# Patient Record
Sex: FEMALE | Race: WHITE | Employment: FULL TIME | URBAN - NONMETROPOLITAN AREA
[De-identification: names, ages, dates, MRNs, and addresses within clinical notes are randomized per-mention and may not be internally consistent; named-entity substitution may affect disease eponyms.]

---

## 2020-10-20 ENCOUNTER — OFFICE VISIT (OUTPATIENT)
Dept: OBGYN CLINIC | Age: 45
End: 2020-10-20
Payer: COMMERCIAL

## 2020-10-20 VITALS
SYSTOLIC BLOOD PRESSURE: 116 MMHG | DIASTOLIC BLOOD PRESSURE: 74 MMHG | HEIGHT: 65 IN | WEIGHT: 132.4 LBS | BODY MASS INDEX: 22.06 KG/M2

## 2020-10-20 VITALS — HEIGHT: 65 IN

## 2020-10-20 PROCEDURE — 99396 PREV VISIT EST AGE 40-64: CPT | Performed by: ADVANCED PRACTICE MIDWIFE

## 2020-10-20 SDOH — HEALTH STABILITY: MENTAL HEALTH: HOW OFTEN DO YOU HAVE A DRINK CONTAINING ALCOHOL?: NEVER

## 2020-10-20 ASSESSMENT — ENCOUNTER SYMPTOMS
GASTROINTESTINAL NEGATIVE: 1
EYES NEGATIVE: 1
RESPIRATORY NEGATIVE: 1

## 2020-10-20 NOTE — PROGRESS NOTES
YEARLY PHYSICAL    Date of service: 10/20/2020    Linette Valle  Is a 41 y.o.   female    PT's PCP is: No primary care provider on file. : 1975                                             Subjective:       Patient's last menstrual period was 10/06/2020. Are your menses regular: yes    OB History    Para Term  AB Living   3 3 0 0 0 3   SAB TAB Ectopic Molar Multiple Live Births   0 0 0 0 0 3      # Outcome Date GA Lbr Parveen/2nd Weight Sex Delivery Anes PTL Lv   3 Para            2 Para            1 Para                 Social History     Tobacco Use   Smoking Status Never Smoker   Smokeless Tobacco Never Used        Social History     Substance and Sexual Activity   Alcohol Use Never    Frequency: Never       Family History   Problem Relation Age of Onset    Uterine Cancer Paternal Grandmother     Alzheimer's Disease Maternal Grandmother     Colon Cancer Maternal Grandmother     Stomach Cancer Maternal Grandmother     Mental Illness Father     Lung Cancer Mother        Any family history of breast or ovarian cancer: Uterine patient believes, is sure it was female origin    Any family history of blood clots: No      Allergies: Patient has no known allergies.       Current Outpatient Medications:     Multiple Vitamins-Iron (DAILY MULTI-VITAMINS/IRON PO), Take by mouth, Disp: , Rfl:     Social History     Substance and Sexual Activity   Sexual Activity Yes    Partners: Male       Any bleeding or pain with intercourse: No    Last Yearly:  2019    Last pap: 2018 - normal    Last HPV: 2018 - negative    Last Mammogram:  - Maribell Webber requesting records from [facility] n/a    Last colorectal screen- type: n/a    Do you do self breast exams: No    Past Medical History:   Diagnosis Date    Anemia        Past Surgical History:   Procedure Laterality Date    TONSILLECTOMY      WISDOM TOOTH EXTRACTION Family History   Problem Relation Age of Onset    Uterine Cancer Paternal Grandmother     Alzheimer's Disease Maternal Grandmother     Colon Cancer Maternal Grandmother     Stomach Cancer Maternal Grandmother     Mental Illness Father     Lung Cancer Mother        Chief Complaint   Patient presents with    Gynecologic Exam     No concerns voiced,  had a vasectomy        Labs:    No results found for this visit on 10/20/20. HPI: Denies breast/pelvic concerns. Spouse has had a vasectomy. Menses regular. Monogamous relationship. Pap utd. Mammogram current per patient. Does not have PCP that is current with but has wellness labs yearly with spouses employer for insurance    Review of Systems   Constitutional: Negative. HENT: Negative. Eyes: Negative. Respiratory: Negative. Cardiovascular: Negative. Gastrointestinal: Negative. Endocrine: Negative. Genitourinary: Negative. Musculoskeletal: Negative. Neurological: Negative. Psychiatric/Behavioral: Negative. Objective  Blood pressure 116/74, height 5' 5\" (1.651 m), weight 132 lb 6.4 oz (60.1 kg), last menstrual period 10/06/2020. Physical Exam  Constitutional:       Appearance: Normal appearance. She is normal weight. Genitourinary:      Pelvic exam was performed with patient in the lithotomy position. Vulva, inguinal canal, urethra, bladder, vagina, cervix, uterus, right adnexa and left adnexa normal.      Uterus is anteverted. HENT:      Head: Normocephalic. Eyes:      Pupils: Pupils are equal, round, and reactive to light. Neck:      Musculoskeletal: Normal range of motion. Cardiovascular:      Rate and Rhythm: Normal rate and regular rhythm. Pulses: Normal pulses. Heart sounds: Normal heart sounds. Pulmonary:      Effort: Pulmonary effort is normal.      Breath sounds: Normal breath sounds.    Chest:      Breasts:         Right: Normal.         Left: Normal.   Abdominal: General: Abdomen is flat. Palpations: Abdomen is soft. Musculoskeletal: Normal range of motion. Neurological:      Mental Status: She is alert and oriented to person, place, and time. Skin:     General: Skin is warm and dry. Psychiatric:         Behavior: Behavior normal.   Vitals signs and nursing note reviewed. Exam conducted with a chaperone present (Erendira Fernandez student CNM Present for and performing exam with assistance). Assessment and Plan          Diagnosis Orders   1. Smear, vaginal, as part of routine gynecological examination       Repeat Annual every 1 year  Cervical Cytology Evaluation begins at 24years old. If Negative Cytology, Follow-up screening per current guidelines. Mammograms every 1year. If 35 yo and last mammogram was negative. Calcium and Vitamin D dosing reviewed. Colonoscopy screening reviewed as well as onset for bone density testing. Birth control and barrier recommendationsdiscussed. STD counseling and prevention reviewed. Gardisil counseling completed for all patients. Routine healthmaintenance per patients PCP. I am having Adonis Eng maintain her Multiple Vitamins-Iron (DAILY MULTI-VITAMINS/IRON PO). Return in about 1 year (around 10/20/2021) for Yearly. She was also counseled on her preventative health maintenance recommendations and follow-up. There are no Patient Instructions on file for this visit.     RONY ARAUJO,10/20/2020 11:36 AM

## 2022-10-10 ENCOUNTER — HOSPITAL ENCOUNTER (OUTPATIENT)
Age: 47
Setting detail: SPECIMEN
Discharge: HOME | End: 2022-10-10

## 2022-10-18 ENCOUNTER — OFFICE VISIT (OUTPATIENT)
Dept: OBGYN CLINIC | Age: 47
End: 2022-10-18
Payer: COMMERCIAL

## 2022-10-18 VITALS
BODY MASS INDEX: 24.66 KG/M2 | DIASTOLIC BLOOD PRESSURE: 80 MMHG | WEIGHT: 148 LBS | SYSTOLIC BLOOD PRESSURE: 120 MMHG | HEIGHT: 65 IN

## 2022-10-18 DIAGNOSIS — Z12.4 SCREENING FOR CERVICAL CANCER: ICD-10-CM

## 2022-10-18 DIAGNOSIS — Z01.419 SMEAR, VAGINAL, AS PART OF ROUTINE GYNECOLOGICAL EXAMINATION: Primary | ICD-10-CM

## 2022-10-18 DIAGNOSIS — Z12.72 SMEAR, VAGINAL, AS PART OF ROUTINE GYNECOLOGICAL EXAMINATION: Primary | ICD-10-CM

## 2022-10-18 PROCEDURE — 99396 PREV VISIT EST AGE 40-64: CPT | Performed by: ADVANCED PRACTICE MIDWIFE

## 2022-10-18 ASSESSMENT — ENCOUNTER SYMPTOMS
DIARRHEA: 0
ABDOMINAL PAIN: 0
CONSTIPATION: 0
SHORTNESS OF BREATH: 0
RESPIRATORY NEGATIVE: 1
GASTROINTESTINAL NEGATIVE: 1

## 2022-10-18 NOTE — PROGRESS NOTES
YEARLY PHYSICAL    Date of service: 10/18/2022    Maude Ponce  Is a 56 y.o.   female    PT's PCP is: No primary care provider on file. : 1975                                             Subjective:       Patient's last menstrual period was 10/10/2022 (exact date). Are your menses regular: yes    OB History    Para Term  AB Living   3 3 0 0 0 3   SAB IAB Ectopic Molar Multiple Live Births   0 0 0 0 0 3      # Outcome Date GA Lbr Parveen/2nd Weight Sex Delivery Anes PTL Lv   3 Para            2 Para            1 Para                 Social History     Tobacco Use   Smoking Status Never   Smokeless Tobacco Never        Social History     Substance and Sexual Activity   Alcohol Use Never       Family History   Problem Relation Age of Onset    Uterine Cancer Paternal Grandmother     Alzheimer's Disease Maternal Grandmother     Colon Cancer Maternal Grandmother     Stomach Cancer Maternal Grandmother     Mental Illness Father     Lung Cancer Mother        Any family history of breast or ovarian cancer: No    Any family history of blood clots: No      Allergies: Patient has no known allergies.       Current Outpatient Medications:     Multiple Vitamins-Iron (DAILY MULTI-VITAMINS/IRON PO), Take by mouth, Disp: , Rfl:     Social History     Substance and Sexual Activity   Sexual Activity Yes    Partners: Male    Comment: vasectomy       Any bleeding or pain with intercourse: No    Last Yearly:      Last pap: 2018 - negative    Last HPV: 2018 - negative    Last Mammogram: Due    Do you do self breast exams: Encouraged    Past Medical History:   Diagnosis Date    Anemia        Past Surgical History:   Procedure Laterality Date    TONSILLECTOMY      WISDOM TOOTH EXTRACTION         Family History   Problem Relation Age of Onset    Uterine Cancer Paternal Grandmother     Alzheimer's Disease Maternal Grandmother     Colon Cancer Maternal Grandmother     Stomach Cancer Maternal Grandmother     Mental Illness Father     Lung Cancer Mother        Chief Complaint   Patient presents with    Annual Exam     Pap due. Mammogram due. Denies concerns. Labs:    No results found for this visit on 10/18/22. HPI:  Annual.  Denies breast/pelvic concerns. Menses regular. One month will be light, next month heavier but still very regular. Due for pap smear and mammogram - last annual was 2 years ago. Monogamous relationship    Review of Systems   Constitutional: Negative. Negative for chills, fatigue and fever. HENT: Negative. Respiratory: Negative. Negative for shortness of breath. Cardiovascular: Negative. Negative for chest pain. Gastrointestinal: Negative. Negative for abdominal pain, constipation and diarrhea. Genitourinary:  Negative for dysuria, enuresis, frequency, menstrual problem, pelvic pain, urgency and vaginal bleeding. Musculoskeletal: Negative. Neurological: Negative. Negative for dizziness, light-headedness and headaches. Psychiatric/Behavioral: Negative. Objective  Blood pressure 120/80, height 5' 5\" (1.651 m), weight 148 lb (67.1 kg), last menstrual period 10/10/2022. Physical Exam  Constitutional:       Appearance: Normal appearance. She is normal weight. Genitourinary:      Vulva, bladder and urethral meatus normal.      No vaginal discharge or tenderness. No vaginal prolapse present. Right Adnexa: not tender. Left Adnexa: not tender. No cervical motion tenderness or discharge. Uterus is not tender. Pelvic exam was performed with patient in the lithotomy position. Breasts:     Breasts are symmetrical.      Breasts are soft. Right: No mass, nipple discharge, skin change or tenderness. Left: No mass, nipple discharge, skin change or tenderness. HENT:      Head: Normocephalic. Eyes:      Pupils: Pupils are equal, round, and reactive to light. Cardiovascular:      Rate and Rhythm: Normal rate and regular rhythm. Pulses: Normal pulses. Heart sounds: Normal heart sounds. No murmur heard. Pulmonary:      Effort: Pulmonary effort is normal.      Breath sounds: Normal breath sounds. No wheezing. Abdominal:      Palpations: Abdomen is soft. Tenderness: There is no abdominal tenderness. Musculoskeletal:         General: Normal range of motion. Cervical back: Normal range of motion. No muscular tenderness. Neurological:      Mental Status: She is alert and oriented to person, place, and time. Skin:     General: Skin is warm and dry. Psychiatric:         Behavior: Behavior normal.   Vitals and nursing note reviewed. Chaperone present: Declined. Assessment and Plan          Diagnosis Orders   1. Smear, vaginal, as part of routine gynecological examination        2. Screening for cervical cancer  PAP SMEAR          Repeat Annual every 1 year  Cervical Cytology Evaluation begins at 24years old. If Negative Cytology, Follow-up screening per current guidelines. Mammograms every 1year. If 35 yo and last mammogram was negative. Calcium and Vitamin D dosing reviewed. Birth control and barrier recommendationsdiscussed. STD counseling and prevention reviewed. Routine healthmaintenance per patients PCP. I am having Brenton Hicks maintain her Multiple Vitamins-Iron (DAILY MULTI-VITAMINS/IRON PO). Return in about 1 year (around 10/18/2023) for Yearly. She was also counseled on her preventative health maintenance recommendations and follow-up. There are no Patient Instructions on file for this visit.     [address] CNM,10/18/2022 10:07 AM

## 2022-10-20 LAB
HPV SAMPLE: NORMAL
HPV, GENOTYPE 16: NOT DETECTED
HPV, GENOTYPE 18: NOT DETECTED
HPV, HIGH RISK OTHER: NOT DETECTED
HPV, INTERPRETATION: NORMAL
SPECIMEN DESCRIPTION: NORMAL

## 2022-10-21 LAB — CYTOLOGY REPORT: NORMAL

## 2023-10-19 ENCOUNTER — TELEPHONE (OUTPATIENT)
Dept: OBGYN CLINIC | Age: 48
End: 2023-10-19

## 2024-04-18 ENCOUNTER — OFFICE VISIT (OUTPATIENT)
Dept: OBGYN CLINIC | Age: 49
End: 2024-04-18
Payer: COMMERCIAL

## 2024-04-18 VITALS
SYSTOLIC BLOOD PRESSURE: 100 MMHG | DIASTOLIC BLOOD PRESSURE: 70 MMHG | BODY MASS INDEX: 26.66 KG/M2 | WEIGHT: 160 LBS | HEIGHT: 65 IN

## 2024-04-18 DIAGNOSIS — Z01.419 SMEAR, VAGINAL, AS PART OF ROUTINE GYNECOLOGICAL EXAMINATION: Primary | ICD-10-CM

## 2024-04-18 DIAGNOSIS — Z12.72 SMEAR, VAGINAL, AS PART OF ROUTINE GYNECOLOGICAL EXAMINATION: Primary | ICD-10-CM

## 2024-04-18 PROCEDURE — 99396 PREV VISIT EST AGE 40-64: CPT | Performed by: ADVANCED PRACTICE MIDWIFE

## 2024-04-18 ASSESSMENT — ENCOUNTER SYMPTOMS
GASTROINTESTINAL NEGATIVE: 1
RESPIRATORY NEGATIVE: 1
CONSTIPATION: 0
SHORTNESS OF BREATH: 0
ABDOMINAL PAIN: 0
DIARRHEA: 0

## 2024-04-18 NOTE — PROGRESS NOTES
YEARLY PHYSICAL    Date of service: 2024    Tamela Ybarra  Is a 48 y.o.   female    PT's PCP is: Bj Leos DO     : 1975                                             Subjective:       Patient's last menstrual period was 2024 (exact date).     Are your menses regular: yes    OB History    Para Term  AB Living   3 3 0 0 0 3   SAB IAB Ectopic Molar Multiple Live Births   0 0 0 0 0 3      # Outcome Date GA Lbr Parveen/2nd Weight Sex Delivery Anes PTL Lv   3 Para            2 Para            1 Para                 Social History     Tobacco Use   Smoking Status Never   Smokeless Tobacco Never        Social History     Substance and Sexual Activity   Alcohol Use Never       Family History   Problem Relation Age of Onset    Uterine Cancer Paternal Grandmother     Alzheimer's Disease Maternal Grandmother     Colon Cancer Maternal Grandmother     Stomach Cancer Maternal Grandmother     Mental Illness Father     Lung Cancer Mother        Any family history of breast or ovarian cancer: No    Any family history of blood clots: No      Allergies: Patient has no known allergies.      Current Outpatient Medications:     Multiple Vitamins-Iron (DAILY MULTI-VITAMINS/IRON PO), Take by mouth, Disp: , Rfl:     Social History     Substance and Sexual Activity   Sexual Activity Yes    Partners: Male    Comment: vasectomy       Any bleeding or pain with intercourse: No    Last Yearly:      Last pap: 10/2022 - normal    Last HPV: 10/2022 - negative    Last Mammogram: 2024    Do you do self breast exams: Encouraged    Past Medical History:   Diagnosis Date    Anemia        Past Surgical History:   Procedure Laterality Date    TONSILLECTOMY      WISDOM TOOTH EXTRACTION         Family History   Problem Relation Age of Onset    Uterine Cancer Paternal Grandmother     Alzheimer's Disease Maternal Grandmother     Colon Cancer Maternal

## 2025-04-17 NOTE — PROGRESS NOTES
YEARLY PHYSICAL    Date of service: 2025    Tamela Ybarra  Is a PT's PCP is: Bj Leos DO     : 1975                                             Subjective:       Patient's last menstrual period was 2025 (exact date).     Are your menses regular: no    OB History    Para Term  AB Living   3 3 0 0 0 3   SAB IAB Ectopic Molar Multiple Live Births   0 0 0 0 0 3      # Outcome Date GA Lbr Parveen/2nd Weight Sex Type Anes PTL Lv   3 Para            2 Para            1 Para                 Social History     Tobacco Use   Smoking Status Never   Smokeless Tobacco Never        Social History     Substance and Sexual Activity   Alcohol Use Never       Family History   Problem Relation Age of Onset    Uterine Cancer Paternal Grandmother     Alzheimer's Disease Maternal Grandmother     Colon Cancer Maternal Grandmother     Stomach Cancer Maternal Grandmother     Mental Illness Father     Lung Cancer Mother        Any family history of breast or ovarian cancer: No    Any family history of blood clots: No      Allergies: Patient has no known allergies.      Current Outpatient Medications:     Multiple Vitamins-Iron (DAILY MULTI-VITAMINS/IRON PO), Take by mouth, Disp: , Rfl:     Social History     Substance and Sexual Activity   Sexual Activity Yes    Partners: Male    Comment: vasectomy       Any bleeding or pain with intercourse: Sometimes with entry and relates this to dryness    Last Yearly:      Last pap:  - normal    Last HPV:  - negative    Last Mammogram: 2024    Last colorectal screen: Due    Do you do self breast exams: Encouraged    Past Medical History:   Diagnosis Date    Anemia        Past Surgical History:   Procedure Laterality Date    TONSILLECTOMY      WISDOM TOOTH EXTRACTION         Family History   Problem Relation Age of Onset    Uterine Cancer Paternal Grandmother     Alzheimer's Disease

## 2025-04-21 ENCOUNTER — HOSPITAL ENCOUNTER (OUTPATIENT)
Age: 50
Setting detail: SPECIMEN
Discharge: HOME | End: 2025-04-21

## 2025-04-21 ENCOUNTER — TELEPHONE (OUTPATIENT)
Dept: OBGYN CLINIC | Age: 50
End: 2025-04-21

## 2025-04-21 ENCOUNTER — OFFICE VISIT (OUTPATIENT)
Dept: OBGYN CLINIC | Age: 50
End: 2025-04-21
Payer: COMMERCIAL

## 2025-04-21 VITALS
WEIGHT: 158 LBS | HEIGHT: 65 IN | DIASTOLIC BLOOD PRESSURE: 60 MMHG | BODY MASS INDEX: 26.33 KG/M2 | SYSTOLIC BLOOD PRESSURE: 90 MMHG

## 2025-04-21 DIAGNOSIS — Z01.419 VISIT FOR GYNECOLOGIC EXAMINATION: Primary | ICD-10-CM

## 2025-04-21 DIAGNOSIS — Z12.11 SCREENING FOR COLON CANCER: ICD-10-CM

## 2025-04-21 DIAGNOSIS — Z12.4 SCREENING FOR CERVICAL CANCER: ICD-10-CM

## 2025-04-21 DIAGNOSIS — N92.6 IRREGULAR MENSES: ICD-10-CM

## 2025-04-21 DIAGNOSIS — Z12.31 ENCOUNTER FOR SCREENING MAMMOGRAM FOR MALIGNANT NEOPLASM OF BREAST: ICD-10-CM

## 2025-04-21 PROCEDURE — 99396 PREV VISIT EST AGE 40-64: CPT | Performed by: ADVANCED PRACTICE MIDWIFE

## 2025-04-21 SDOH — ECONOMIC STABILITY: FOOD INSECURITY: WITHIN THE PAST 12 MONTHS, YOU WORRIED THAT YOUR FOOD WOULD RUN OUT BEFORE YOU GOT MONEY TO BUY MORE.: NEVER TRUE

## 2025-04-21 SDOH — ECONOMIC STABILITY: FOOD INSECURITY: WITHIN THE PAST 12 MONTHS, THE FOOD YOU BOUGHT JUST DIDN'T LAST AND YOU DIDN'T HAVE MONEY TO GET MORE.: NEVER TRUE

## 2025-04-23 ENCOUNTER — RESULTS FOLLOW-UP (OUTPATIENT)
Dept: OBGYN CLINIC | Age: 50
End: 2025-04-23

## 2025-04-23 LAB
HPV I/H RISK 4 DNA CVX QL NAA+PROBE: NOT DETECTED
HPV SAMPLE: NORMAL
HPV, INTERPRETATION: NORMAL
HPV16 DNA CVX QL NAA+PROBE: NOT DETECTED
HPV18 DNA CVX QL NAA+PROBE: NOT DETECTED
SPECIMEN DESCRIPTION: NORMAL

## 2025-04-28 LAB — CYTOLOGY REPORT: NORMAL
